# Patient Record
Sex: MALE | Race: WHITE | URBAN - METROPOLITAN AREA
[De-identification: names, ages, dates, MRNs, and addresses within clinical notes are randomized per-mention and may not be internally consistent; named-entity substitution may affect disease eponyms.]

---

## 2017-01-04 ENCOUNTER — OFFICE VISIT (OUTPATIENT)
Dept: FAMILY MEDICINE | Facility: CLINIC | Age: 62
End: 2017-01-04
Payer: COMMERCIAL

## 2017-01-04 VITALS
DIASTOLIC BLOOD PRESSURE: 79 MMHG | HEIGHT: 71 IN | TEMPERATURE: 97.4 F | SYSTOLIC BLOOD PRESSURE: 128 MMHG | BODY MASS INDEX: 30.62 KG/M2 | WEIGHT: 218.7 LBS | HEART RATE: 62 BPM

## 2017-01-04 DIAGNOSIS — Z11.59 NEED FOR HEPATITIS C SCREENING TEST: ICD-10-CM

## 2017-01-04 DIAGNOSIS — Z00.00 ROUTINE GENERAL MEDICAL EXAMINATION AT A HEALTH CARE FACILITY: Primary | ICD-10-CM

## 2017-01-04 DIAGNOSIS — E78.5 HYPERLIPIDEMIA LDL GOAL <130: ICD-10-CM

## 2017-01-04 DIAGNOSIS — Z00.01 ENCOUNTER FOR ROUTINE ADULT MEDICAL EXAM WITH ABNORMAL FINDINGS: ICD-10-CM

## 2017-01-04 DIAGNOSIS — R25.2 CRAMP OF LIMB: ICD-10-CM

## 2017-01-04 PROCEDURE — 99396 PREV VISIT EST AGE 40-64: CPT | Performed by: FAMILY MEDICINE

## 2017-01-04 ASSESSMENT — ANXIETY QUESTIONNAIRES
GAD7 TOTAL SCORE: 4
1. FEELING NERVOUS, ANXIOUS, OR ON EDGE: SEVERAL DAYS
6. BECOMING EASILY ANNOYED OR IRRITABLE: NOT AT ALL
2. NOT BEING ABLE TO STOP OR CONTROL WORRYING: NOT AT ALL
5. BEING SO RESTLESS THAT IT IS HARD TO SIT STILL: NOT AT ALL
7. FEELING AFRAID AS IF SOMETHING AWFUL MIGHT HAPPEN: SEVERAL DAYS
3. WORRYING TOO MUCH ABOUT DIFFERENT THINGS: SEVERAL DAYS

## 2017-01-04 ASSESSMENT — PATIENT HEALTH QUESTIONNAIRE - PHQ9: 5. POOR APPETITE OR OVEREATING: SEVERAL DAYS

## 2017-01-04 NOTE — NURSING NOTE
"Chief Complaint   Patient presents with     Physical       Initial /79 mmHg  Pulse 62  Temp(Src) 97.4  F (36.3  C) (Tympanic)  Ht 5' 10.75\" (1.797 m)  Wt 218 lb 11.2 oz (99.202 kg)  BMI 30.72 kg/m2 Estimated body mass index is 30.72 kg/(m^2) as calculated from the following:    Height as of this encounter: 5' 10.75\" (1.797 m).    Weight as of this encounter: 218 lb 11.2 oz (99.202 kg).  BP completed using cuff size: large in right arm  Sherley Lutz CMA    "

## 2017-01-04 NOTE — Clinical Note
My Depression Action Plan  Name: Mynor Siddiqui   Date of Birth 1955  Date: 1/4/2017    My doctor: Anthony Manzo   My clinic: [address] [address] [phone]          GREEN    ZONE   Good Control    What it looks like:     Things are going generally well. You have normal up s and down s. You may even feel depressed from time to time, but bad moods usually last less than a day.   What you need to do:  1. Continue to care for yourself (see self care plan)  2. Check your depression survival kit and update it as needed  3. Follow your physician s recommendations including any medication.  4. Do not stop taking medication unless you consult with your physician first.           YELLOW         ZONE Getting Worse    What it looks like:     Depression is starting to interfere with your life.     It may be hard to get out of bed; you may be starting to isolate yourself from others.    Symptoms of depression are starting to last most all day and this has happened for several days.     You may have suicidal thoughts but they are not constant.   What you need to do:     1. Call your care team, your response to treatment will improve if you keep your care team informed of your progress. Yellow periods are signs an adjustment may need to be made.     2. Continue your self-care, even if you have to fake it!    3. Talk to someone in your support network    4. Open up your depression survival kit           RED    ZONE Medical Alert - Get Help    What it looks like:     Depression is seriously interfering with your life.     You may experience these or other symptoms: You can t get out of bed most days, can t work or engage in other necessary activities, you have trouble taking care of basic hygiene, or basic responsibilities, thoughts of suicide or death that will not go away, self-injurious behavior.     What you need to do:  1. Call your care team and request a  same-day appointment. If they are not available (weekends or after hours) call your local crisis line, emergency room or 911.      Electronically signed by: Sherley Lutz, January 4, 2017    Depression Self Care Plan / Survival Kit    Self-Care for Depression  Here s the deal. Your body and mind are really not as separate as most people think.  What you do and think affects how you feel and how you feel influences what you do and think. This means if you do things that people who feel good do, it will help you feel better.  Sometimes this is all it takes.  There is also a place for medication and therapy depending on how severe your depression is, so be sure to consult with your medical provider and/ or Behavioral Health Consultant if your symptoms are worsening or not improving.     In order to better manage my stress, I will:    Exercise  Get some form of exercise, every day. This will help reduce pain and release endorphins, the  feel good  chemicals in your brain. This is almost as good as taking antidepressants!  This is not the same as joining a gym and then never going! (they count on that by the way ) It can be as simple as just going for a walk or doing some gardening, anything that will get you moving.      Hygiene   Maintain good hygiene (Get out of bed in the morning, Make your bed, Brush your teeth, Take a shower, and Get dressed like you were going to work, even if you are unemployed).  If your clothes don't fit try to get ones that do.    Diet  I will strive to eat foods that are good for me, drink plenty of water, and avoid excessive sugar, caffeine, alcohol, and other mood-altering substances.  Some foods that are helpful in depression are: complex carbohydrates, B vitamins, flaxseed, fish or fish oil, fresh fruits and vegetables.    Psychotherapy  I agree to participate in Individual Therapy (if recommended).    Medication  If prescribed medications, I agree to take them.  Missing doses can result in  serious side effects.  I understand that drinking alcohol, or other illicit drug use, may cause potential side effects.  I will not stop my medication abruptly without first discussing it with my provider.    Staying Connected With Others  I will stay in touch with my friends, family members, and my primary care provider/team.    Use your imagination  Be creative.  We all have a creative side; it doesn t matter if it s oil painting, sand castles, or mud pies! This will also kick up the endorphins.    Witness Beauty  (AKA stop and smell the roses) Take a look outside, even in mid-winter. Notice colors, textures. Watch the squirrels and birds.     Service to others  Be of service to others.  There is always someone else in need.  By helping others we can  get out of ourselves  and remember the really important things.  This also provides opportunities for practicing all the other parts of the program.    Humor  Laugh and be silly!  Adjust your TV habits for less news and crime-drama and more comedy.    Control your stress  Try breathing deep, massage therapy, biofeedback, and meditation. Find time to relax each day.     My support system    Clinic Contact:  Phone number:    Contact 1:  Phone number:    Contact 2:  Phone number:    Buddhist/:  Phone number:    Therapist:  Phone number:    Local crisis center:    Phone number:    Other community support:  Phone number:

## 2017-01-04 NOTE — PROGRESS NOTES
SUBJECTIVE:     CC: Mynor Siddiqui is an 61 year old male who presents for preventative health visit.     Healthy Habits:    Annual Exam:  Getting at least 3 servings of Calcium per day:: NO  Bi-annual eye exam:: Yes  Dental care twice a year:: Yes  Sleep apnea or symptoms of sleep apnea:: None  Diet:: Regular (no restrictions)  Frequency of exercise:: None  Taking medications regularly:: Yes  Medication side effects:: Other  PHQ-2 Depressed: Several days, Several days  PHQ-2 Score: 2          Today's PHQ-2 Score:   PHQ-2 ( 1999 Pfizer) 1/1/2017 12/23/2015   Q1: Little interest or pleasure in doing things - 2   Q2: Feeling down, depressed or hopeless - 1   PHQ-2 Score - 3   Little interest or pleasure in doing things Several days -   Feeling down, depressed or hopeless Several days -   PHQ-2 Score 2 -       Abuse: Current or Past(Physical, Sexual or Emotional)- no  Do you feel safe in your environment - Yes    Social History   Substance Use Topics     Smoking status: Never Smoker      Smokeless tobacco: Never Used     Alcohol Use: No     The patient does not drink >3 drinks per day nor >7 drinks per week.    Last PSA:   PSA   Date Value Ref Range Status   03/05/2012 1.30 0 - 4 ug/L Final       Recent Labs   Lab Test  12/23/15   1116  04/08/15   1019  03/26/14   0845   CHOL  210*  172  195   HDL  49  47  34*   LDL  116*  95  106   TRIG  224*  152*  276*   CHOLHDLRATIO   --   3.7  6.0*   NHDL  161*   --    --        Reviewed orders with patient. Reviewed health maintenance and updated orders accordingly - Yes    All Histories reviewed and updated in Epic.      ROS:  C: NEGATIVE for fever, chills, change in weight  I: NEGATIVE for worrisome rashes, moles or lesions  E: NEGATIVE for vision changes or irritation  ENT: NEGATIVE for ear, mouth and throat problems  R: NEGATIVE for significant cough or SOB  CV: NEGATIVE for chest pain, palpitations or peripheral edema  GI: NEGATIVE for nausea, abdominal pain, heartburn,  "or change in bowel habits   male: negative for dysuria, hematuria, decreased urinary stream, erectile dysfunction, urethral discharge  M: NEGATIVE for significant arthralgias or myalgia  N: NEGATIVE for weakness, dizziness or paresthesias  P: NEGATIVE for changes in mood or affect    Problem list, Medication list, Allergies, and Medical/Social/Surgical histories reviewed in [facility] and updated as appropriate.  OBJECTIVE:     /79 mmHg  Pulse 62  Temp(Src) 97.4  F (36.3  C) (Tympanic)  Ht 5' 10.75\" (1.797 m)  Wt 218 lb 11.2 oz (99.202 kg)  BMI 30.72 kg/m2     EXAM:  GENERAL: healthy, alert and no distress  NECK: no adenopathy, no asymmetry, masses, or scars and thyroid normal to palpation  RESP: lungs clear to auscultation - no rales, rhonchi or wheezes  CV: regular rate and rhythm, normal S1 S2, no S3 or S4, no murmur, click or rub, no peripheral edema and peripheral pulses strong  ABDOMEN: soft, nontender, no hepatosplenomegaly, no masses and bowel sounds normal  MS: no gross musculoskeletal defects noted, no edema    ASSESSMENT/PLAN:     1. Routine general medical examination at a health care facility    - Lipid Profile with reflex to direct LDL; Future  - Hepatitis C Screen Reflex to HCV RNA Quant and Genotype; Future    2. Encounter for routine adult medical exam with abnormal findings      3. Hyperlipidemia LDL goal <130    - Lipid Profile with reflex to direct LDL; Future    4. Need for hepatitis C screening test    - Hepatitis C Screen Reflex to HCV RNA Quant and Genotype; Future    COUNSELING:  Reviewed preventive health counseling, as reflected in patient instructions       Regular exercise       Healthy diet/nutrition       Hearing screening       Colon cancer screening       Prostate cancer screening       reports that he has never smoked. He has never used smokeless tobacco.    Estimated body mass index is 30.72 kg/(m^2) as calculated from the following:    Height as of this encounter: 5' 10.75\" " (1.797 m).    Weight as of this encounter: 218 lb 11.2 oz (99.202 kg).   Weight management plan: Discussed healthy diet and exercise guidelines and patient will follow up in 12 months in clinic to re-evaluate.    Counseling Resources:  ATP IV Guidelines  Pooled Cohorts Equation Calculator  FRAX Risk Assessment  ICSI Preventive Guidelines  Dietary Guidelines for Americans, 2010  USDA's MyPlate  ASA Prophylaxis  Lung CA Screening    Anthony Manzo MD  [facility] HUGOAnswers for HPI/ROS submitted by the patient on 1/1/2017

## 2017-01-04 NOTE — PROGRESS NOTES
"SUBJECTIVE:     CC: Mynor Siddiqui is an 61 year old male who presents for preventative health visit.   Answers for HPI/ROS submitted by the patient on 1/1/2017   PHQ-2 Depressed: Several days, Several days  PHQ-2 Score: 2      HPI    {Outside tests to abstract? :163318}    {additional problems to add:235410}    Today's PHQ-2 Score:   PHQ-2 ( 1999 Pfizer) 1/1/2017   Q1: Little interest or pleasure in doing things -   Q2: Feeling down, depressed or hopeless -   PHQ-2 Score -   Little interest or pleasure in doing things Several days   Feeling down, depressed or hopeless Several days   PHQ-2 Score 2       Abuse: Current or Past(Physical, Sexual or Emotional)- {YES/NO/NA:565348}  Do you feel safe in your environment - {YES/NO/NA:168407}    Social History   Substance Use Topics     Smoking status: Never Smoker      Smokeless tobacco: Never Used     Alcohol Use: No     {ETOH AUDIT:791825}    Last PSA:   PSA   Date Value Ref Range Status   03/05/2012 1.30 0 - 4 ug/L Final       Recent Labs   Lab Test  12/23/15   1116  04/08/15   1019  03/26/14   0845   CHOL  210*  172  195   HDL  49  47  34*   LDL  116*  95  106   TRIG  224*  152*  276*   CHOLHDLRATIO   --   3.7  6.0*   NHDL  161*   --    --        Reviewed orders with patient. Reviewed health maintenance and updated orders accordingly - {Yes/No:002057::\"Yes\"}    All Histories reviewed and updated in Epic.  {HISTORY OPTIONS:797964}    ROS:  {MALE ROS-adult preventive care package:919315::\"C: NEGATIVE for fever, chills, change in weight\",\"I: NEGATIVE for worrisome rashes, moles or lesions\",\"E: NEGATIVE for vision changes or irritation\",\"ENT: NEGATIVE for ear, mouth and throat problems\",\"R: NEGATIVE for significant cough or SOB\",\"CV: NEGATIVE for chest pain, palpitations or peripheral edema\",\"GI: NEGATIVE for nausea, abdominal pain, heartburn, or change in bowel habits\",\" male: negative for dysuria, hematuria, decreased urinary stream, erectile dysfunction, urethral " "discharge\",\"M: NEGATIVE for significant arthralgias or myalgia\",\"N: NEGATIVE for weakness, dizziness or paresthesias\",\"P: NEGATIVE for changes in mood or affect\"}    {CHRONICPROBDATA:779391}  OBJECTIVE:     There were no vitals taken for this visit.  EXAM:  {Exam Choices:078355}    ASSESSMENT/PLAN:     {Diag Picklist:312018}    COUNSELING:   {MALE COUNSELING MESSAGES:336841::\"Reviewed preventive health counseling, as reflected in patient instructions\"}    {Blood Pressure - Adult Preventive:214265}     reports that he has never smoked. He has never used smokeless tobacco.  {Tobacco Cessation needed for ACO -- Delete if patient is a non-smoker:642665}  Estimated body mass index is 28.88 kg/(m^2) as calculated from the following:    Height as of 12/23/15: 5' 10.75\" (1.797 m).    Weight as of 12/23/15: 205 lb 9.6 oz (93.26 kg).   {Weight Management Plan needed for ACO:835759}    Counseling Resources:  ATP IV Guidelines  Pooled Cohorts Equation Calculator  FRAX Risk Assessment  ICSI Preventive Guidelines  Dietary Guidelines for Americans, 2010  USDA's MyPlate  ASA Prophylaxis  Lung CA Screening    Anthony Manzo MD  [facility]  "

## 2017-01-05 DIAGNOSIS — Z11.59 NEED FOR HEPATITIS C SCREENING TEST: ICD-10-CM

## 2017-01-05 DIAGNOSIS — E78.5 HYPERLIPIDEMIA LDL GOAL <130: ICD-10-CM

## 2017-01-05 DIAGNOSIS — Z00.00 ROUTINE GENERAL MEDICAL EXAMINATION AT A HEALTH CARE FACILITY: ICD-10-CM

## 2017-01-05 LAB
ANION GAP SERPL CALCULATED.3IONS-SCNC: 9 MMOL/L (ref 3–14)
BUN SERPL-MCNC: 20 MG/DL (ref 7–30)
CALCIUM SERPL-MCNC: 9.1 MG/DL (ref 8.5–10.1)
CHLORIDE SERPL-SCNC: 104 MMOL/L (ref 94–109)
CHOLEST SERPL-MCNC: 258 MG/DL
CO2 SERPL-SCNC: 26 MMOL/L (ref 20–32)
CREAT SERPL-MCNC: 1.21 MG/DL (ref 0.66–1.25)
GFR SERPL CREATININE-BSD FRML MDRD: 60 ML/MIN/1.7M2
GLUCOSE SERPL-MCNC: 82 MG/DL (ref 70–99)
HDLC SERPL-MCNC: 48 MG/DL
LDLC SERPL CALC-MCNC: 159 MG/DL
NONHDLC SERPL-MCNC: 210 MG/DL
POTASSIUM SERPL-SCNC: 4.2 MMOL/L (ref 3.4–5.3)
SODIUM SERPL-SCNC: 139 MMOL/L (ref 133–144)
TRIGL SERPL-MCNC: 253 MG/DL

## 2017-01-05 PROCEDURE — 36415 COLL VENOUS BLD VENIPUNCTURE: CPT | Performed by: FAMILY MEDICINE

## 2017-01-05 PROCEDURE — 80048 BASIC METABOLIC PNL TOTAL CA: CPT | Mod: 90 | Performed by: FAMILY MEDICINE

## 2017-01-05 PROCEDURE — 80061 LIPID PANEL: CPT | Mod: 90 | Performed by: FAMILY MEDICINE

## 2017-01-05 PROCEDURE — 86803 HEPATITIS C AB TEST: CPT | Mod: 90 | Performed by: FAMILY MEDICINE

## 2017-01-05 PROCEDURE — 99000 SPECIMEN HANDLING OFFICE-LAB: CPT | Performed by: FAMILY MEDICINE

## 2017-01-05 ASSESSMENT — ANXIETY QUESTIONNAIRES: GAD7 TOTAL SCORE: 4

## 2017-01-05 ASSESSMENT — PATIENT HEALTH QUESTIONNAIRE - PHQ9: SUM OF ALL RESPONSES TO PHQ QUESTIONS 1-9: 6

## 2017-01-06 LAB — HCV AB SERPL QL IA: NORMAL

## 2017-04-17 DIAGNOSIS — F32.0 MILD MAJOR DEPRESSION (H): ICD-10-CM

## 2017-04-17 NOTE — TELEPHONE ENCOUNTER
FLUoxetine (PROZAC) 20 MG capsule     Last Written Prescription Date: 5/6/16  Last Fill Quantity: 90, # refills: 1  Last Office Visit with G primary care provider:  1/4/17        Last PHQ-9 score on record=   PHQ-9 SCORE 1/4/2017   Total Score -   Total Score 6

## 2017-04-19 NOTE — TELEPHONE ENCOUNTER
Prescription approved per [facility] Refill Protocol.  Cholo Willis RN

## 2017-07-25 ENCOUNTER — MYC MEDICAL ADVICE (OUTPATIENT)
Dept: FAMILY MEDICINE | Facility: CLINIC | Age: 62
End: 2017-07-25

## 2017-07-25 NOTE — TELEPHONE ENCOUNTER
Explained to patient that he was not originally diagnosed and treated at [place] and we do not have the records from urgent care. Patient understood. If he gets worse, he will make and appointment to be seen.  Elina Kraft RN

## 2017-07-25 NOTE — TELEPHONE ENCOUNTER
Not a mychart. Should be an office visit can't evaluate over my chart. Please have him make an appointment. Jesenia Chaves M.D.'

## 2017-09-26 ENCOUNTER — OFFICE VISIT (OUTPATIENT)
Dept: FAMILY MEDICINE | Facility: CLINIC | Age: 62
End: 2017-09-26
Payer: COMMERCIAL

## 2017-09-26 VITALS
WEIGHT: 208.8 LBS | DIASTOLIC BLOOD PRESSURE: 69 MMHG | BODY MASS INDEX: 29.23 KG/M2 | TEMPERATURE: 97.4 F | HEIGHT: 71 IN | HEART RATE: 61 BPM | SYSTOLIC BLOOD PRESSURE: 118 MMHG

## 2017-09-26 DIAGNOSIS — H02.9 LESION OF LEFT EYELID: Primary | ICD-10-CM

## 2017-09-26 PROCEDURE — 11100 ZZHC BIOPSY SKIN/SUBQ/MUC MEM, SINGLE LESION: CPT | Performed by: FAMILY MEDICINE

## 2017-09-26 PROCEDURE — 88305 TISSUE EXAM BY PATHOLOGIST: CPT | Performed by: FAMILY MEDICINE

## 2017-09-26 ASSESSMENT — PATIENT HEALTH QUESTIONNAIRE - PHQ9
5. POOR APPETITE OR OVEREATING: NOT AT ALL
SUM OF ALL RESPONSES TO PHQ QUESTIONS 1-9: 2

## 2017-09-26 ASSESSMENT — ANXIETY QUESTIONNAIRES
1. FEELING NERVOUS, ANXIOUS, OR ON EDGE: SEVERAL DAYS
GAD7 TOTAL SCORE: 2
7. FEELING AFRAID AS IF SOMETHING AWFUL MIGHT HAPPEN: NOT AT ALL
5. BEING SO RESTLESS THAT IT IS HARD TO SIT STILL: NOT AT ALL
2. NOT BEING ABLE TO STOP OR CONTROL WORRYING: NOT AT ALL
3. WORRYING TOO MUCH ABOUT DIFFERENT THINGS: NOT AT ALL
6. BECOMING EASILY ANNOYED OR IRRITABLE: SEVERAL DAYS

## 2017-09-26 NOTE — MR AVS SNAPSHOT
"              After Visit Summary   9/26/2017    Mynor Siddiqui    MRN: [id number]           Patient Information     Date Of Birth          1955        Visit Information        Provider Department      9/26/2017 2:00 PM Anthony Manzo MD [facility]        Today's Diagnoses     Lesion of left eyelid    -  1       Follow-ups after your visit        Who to contact     Normal or non-critical lab and imaging results will be communicated to you by [facility] [employer]hart, letter or phone within 4 business days after the clinic has received the results. If you do not hear from us within 7 days, please contact the clinic through [employer]hart or phone. If you have a critical or abnormal lab result, we will notify you by phone as soon as possible.  Submit refill requests through Celly or call your pharmacy and they will forward the refill request to us. Please allow 3 business days for your refill to be completed.          If you need to speak with a  for additional information , please call: [phone]             Additional Information About Your Visit        Celly Information     Celly gives you secure access to your electronic health record. If you see a primary care provider, you can also send messages to your care team and make appointments. If you have questions, please call your primary care clinic.  If you do not have a primary care provider, please call [phone] and they will assist you.        Care EveryWhere ID     This is your Care EveryWhere ID. This could be used by other organizations to access your [place] medical records  [id number]        Your Vitals Were     Pulse Temperature Height BMI (Body Mass Index)          61 97.4  F (36.3  C) (Tympanic) 5' 10.75\" (1.797 m) 29.33 kg/m2         Blood Pressure from Last 3 Encounters:   09/26/17 118/69   01/04/17 128/79   12/23/15 136/70    Weight from Last 3 Encounters:   09/26/17 208 lb 12.8 oz (94.7 kg)   01/04/17 218 lb 11.2 oz (99.2 " kg)   12/23/15 205 lb 9.6 oz (93.3 kg)              We Performed the Following     EXC BENIGN SKIN LESION FACE/EARS 0.6-1.0 CM     Surgical pathology exam        Primary Care Provider Office Phone # Fax #    Anthony Manzo [phone]       [facility] [address] [address] [facility] [address]        Equal Access to Services     YOLANDE HOPPER : Hadii aad ku hadasho Soomaali, waaxda luqadaha, qaybta kaalmada adeegyada, waxay idiin hayaan adeeg khspencer laLauraaan ah. So [facility] [phone].    ATENCIÓN: Si habla español, tiene a mayfield disposición servicios gratuitos de asistencia lingüística. Maryann al [phone].    We comply with applicable federal civil rights laws and [place] laws. We do not discriminate on the basis of race, color, national origin, age, disability, sex, sexual orientation, or gender identity.            Thank you!     Thank you for choosing [facility]  for your care. Our goal is always to provide you with excellent care. Hearing back from our patients is one way we can continue to improve our services. Please take a few minutes to complete the written survey that you may receive in the mail after your visit with us. Thank you!             Your Updated Medication List - Protect others around you: Learn how to safely use, store and throw away your medicines at www.disposemymeds.org.          This list is accurate as of: 9/26/17 11:59 PM.  Always use your most recent med list.                   Brand Name Dispense Instructions for use Diagnosis    FLUoxetine 20 MG capsule    PROzac    90 capsule    Take 1 capsule (20 mg) by mouth daily    Mild major depression (H)       ibuprofen 200 MG tablet    ADVIL/MOTRIN     Take 2 tablets by mouth every 4 hours as needed. Once daily        sildenafil 100 MG tablet    VIAGRA    12 tablet    Take 0.5-1 tablets ( mg) by mouth daily as needed for erectile dysfunction Take 30 min to 4 hours before intercourse.  Never use with  nitroglycerin, terazosin or doxazosin.    Inability to maintain erection       tadalafil 5 MG tablet    CIALIS    30 tablet    Take 1 tablet (5 mg) by mouth daily Never use with nitroglycerin, terazosin or doxazosin.  1/2 to 4 tabs 1-6 hr before sex    Routine general medical examination at a health care facility

## 2017-09-26 NOTE — PROGRESS NOTES
SUBJECTIVE:                                                    Mynor Siddiqui is a 62 year old male who presents to clinic today for the following health issues:    Concern - growth on let eyebrow  Onset: started noticing it about a month ago    Description:   Raised but not painful. It is annoying and in the way. He cannot rub his eyes without running into it. He says it feels hard.    Intensity: mild    Progression of Symptoms:  Worsening, It has grown slightly    Accompanying Signs & Symptoms:  none    Previous history of similar problem:   If it is a skin tag he has had a few. If it is something else, he does not think so.    Precipitating factors:   Worsened by: no    Alleviating factors:  Improved by: no    Therapies Tried and outcome: none      Problem list and histories reviewed & adjusted, as indicated.  Additional history:     Patient Active Problem List   Diagnosis     Hyperlipidemia     Anxiety     HYPERLIPIDEMIA LDL GOAL <130     Advanced directives, counseling/discussion     Mild major depression (H)     Health Care Home     Past Surgical History:   Procedure Laterality Date     COLONOSCOPY  05/2006     COLONOSCOPY N/A 4/17/2015    Procedure: COLONOSCOPY;  Surgeon: Ezekiel Alfonso MD;  Location: [facility]     SURGICAL HISTORY OF -   1975    [place]       Social History   Substance Use Topics     Smoking status: Never Smoker     Smokeless tobacco: Never Used     Alcohol use No     Family History   Problem Relation Age of Onset     Alzheimer Disease Mother      OSTEOPOROSIS Mother      CANCER Father      Bladder Cancer     Cancer - colorectal Father      C.A.D. Father      Heart Attack (age 70s     CEREBROVASCULAR DISEASE Paternal Grandfather      Respiratory Sister      COPD           ROS:  Constitutional, HEENT, cardiovascular, pulmonary, gi and gu systems are negative, except as otherwise noted.    OBJECTIVE:                                                    /69 (BP Location: Right arm,  "Patient Position: Sitting, Cuff Size: Adult Large)  Pulse 61  Temp 97.4  F (36.3  C) (Tympanic)  Ht 5' 10.75\" (1.797 m)  Wt 208 lb 12.8 oz (94.7 kg)  BMI 29.33 kg/m2 Body mass index is 29.33 kg/(m^2).   GENERAL: healthy, alert, well nourished, well hydrated, no distress  HENT: ear canals- normal; TMs- normal; Nose- normal; Mouth- no ulcers, no lesions  NECK: no tenderness, no adenopathy, no asymmetry, no masses, no stiffness; thyroid- normal to palpation  RESP: lungs clear to auscultation - no rales, no rhonchi, no wheezes  CV: regular rates and rhythm, normal S1 S2, no S3 or S4 and no murmur, no click or rub -  ABDOMEN: soft, no tenderness, no  hepatosplenomegaly, no masses, normal bowel sounds  Skin: has variegated lesion with keratosis.      ASSESSMENT/PLAN:                                                      (H02.9) Lesion of left eyelid  (primary encounter diagnosis)  Removed without complcations  Plan: Surgical pathology exam       reports that he has never smoked. He has never used smokeless tobacco.        [facility]  "

## 2017-09-26 NOTE — NURSING NOTE
"Chief Complaint   Patient presents with     Growth     on eyebrow       Initial /69 (BP Location: Right arm, Patient Position: Sitting, Cuff Size: Adult Large)  Pulse 61  Temp 97.4  F (36.3  C) (Tympanic)  Ht 5' 10.75\" (1.797 m)  Wt 208 lb 12.8 oz (94.7 kg)  BMI 29.33 kg/m2 Estimated body mass index is 29.33 kg/(m^2) as calculated from the following:    Height as of this encounter: 5' 10.75\" (1.797 m).    Weight as of this encounter: 208 lb 12.8 oz (94.7 kg).  Medication Reconciliation: complete   Sherley Lutz CMA  "

## 2017-09-27 ASSESSMENT — ANXIETY QUESTIONNAIRES: GAD7 TOTAL SCORE: 2

## 2017-09-28 LAB — COPATH REPORT: NORMAL

## 2017-10-10 NOTE — PROCEDURES
After discussing Risks, Benefits and alternative treatments, answering any questons to .name satisfaction. patient requested procedure peformed and signed consent.     Area was sterily pred, drapped.  Using sterile technique lesion was removed, using shave teniquewithot any complication.  Anesthesia was occomplished with 1% lidocaine.  Pt tolerated procedure well.    O.4cc blood loss  Sterile dressing applied.  Hemastasis was obtained with direct pressure

## 2017-11-30 ENCOUNTER — MYC MEDICAL ADVICE (OUTPATIENT)
Dept: FAMILY MEDICINE | Facility: CLINIC | Age: 62
End: 2017-11-30

## 2017-11-30 DIAGNOSIS — M25.569 ACUTE KNEE PAIN, UNSPECIFIED LATERALITY: Primary | ICD-10-CM

## 2018-02-26 DIAGNOSIS — F32.0 MILD MAJOR DEPRESSION (H): ICD-10-CM

## 2018-02-28 NOTE — TELEPHONE ENCOUNTER
"Requested Prescriptions   Pending Prescriptions Disp Refills     FLUoxetine (PROZAC) 20 MG capsule 90 capsule 2     Sig: Take 1 capsule (20 mg) by mouth daily    SSRIs Protocol Passed    2/26/2018 11:05 AM       Passed - PHQ-9 score less than 5 in past 6 months    The PHQ-9 criteria is meant to fail. It requires a PHQ-9 score review         Passed - Patient is age 18 or older       Passed - Recent (6 mo) or future visit with authorizing provider's specialty    Patient had office visit in the last 6 months or has a visit in the next 30 days with authorizing provider.  See \"Patient Info\" tab in inbasket, or \"Choose Columns\" in Meds & Orders section of the refill encounter.            Last Written Prescription Date:  4/19/17  Last Fill Quantity: 90,  # refills: 2   Last office visit: 9/26/2017 with prescribing provider:  MARC   Future Office Visit:      "

## 2018-04-16 ENCOUNTER — MYC MEDICAL ADVICE (OUTPATIENT)
Dept: FAMILY MEDICINE | Facility: CLINIC | Age: 63
End: 2018-04-16

## 2018-04-16 DIAGNOSIS — M25.569 ACUTE KNEE PAIN, UNSPECIFIED LATERALITY: ICD-10-CM

## 2018-05-23 ENCOUNTER — TELEPHONE (OUTPATIENT)
Dept: FAMILY MEDICINE | Facility: CLINIC | Age: 63
End: 2018-05-23

## 2018-05-23 ENCOUNTER — MYC MEDICAL ADVICE (OUTPATIENT)
Dept: FAMILY MEDICINE | Facility: CLINIC | Age: 63
End: 2018-05-23

## 2018-05-23 ENCOUNTER — OFFICE VISIT (OUTPATIENT)
Dept: FAMILY MEDICINE | Facility: CLINIC | Age: 63
End: 2018-05-23
Payer: MEDICARE

## 2018-05-23 VITALS
SYSTOLIC BLOOD PRESSURE: 114 MMHG | WEIGHT: 194.4 LBS | HEART RATE: 53 BPM | TEMPERATURE: 97.3 F | BODY MASS INDEX: 27.22 KG/M2 | HEIGHT: 71 IN | DIASTOLIC BLOOD PRESSURE: 70 MMHG

## 2018-05-23 DIAGNOSIS — N52.03 COMBINED ARTERIAL INSUFFICIENCY AND CORPORO-VENOUS OCCLUSIVE ERECTILE DYSFUNCTION: ICD-10-CM

## 2018-05-23 DIAGNOSIS — Z00.00 ENCOUNTER FOR WELLNESS EXAMINATION: Primary | ICD-10-CM

## 2018-05-23 DIAGNOSIS — F43.23 ADJUSTMENT DISORDER WITH MIXED ANXIETY AND DEPRESSED MOOD: ICD-10-CM

## 2018-05-23 DIAGNOSIS — F32.0 MILD MAJOR DEPRESSION (H): ICD-10-CM

## 2018-05-23 DIAGNOSIS — K42.9 PERIUMBILICAL HERNIA: ICD-10-CM

## 2018-05-23 DIAGNOSIS — Z13.220 LIPID SCREENING: ICD-10-CM

## 2018-05-23 DIAGNOSIS — Z11.4 SCREENING FOR HIV (HUMAN IMMUNODEFICIENCY VIRUS): ICD-10-CM

## 2018-05-23 LAB
CHOLEST SERPL-MCNC: 207 MG/DL
HDLC SERPL-MCNC: 60 MG/DL
LDLC SERPL CALC-MCNC: 125 MG/DL
NONHDLC SERPL-MCNC: 147 MG/DL
TRIGL SERPL-MCNC: 109 MG/DL

## 2018-05-23 PROCEDURE — 87389 HIV-1 AG W/HIV-1&-2 AB AG IA: CPT | Performed by: FAMILY MEDICINE

## 2018-05-23 PROCEDURE — 80061 LIPID PANEL: CPT | Performed by: FAMILY MEDICINE

## 2018-05-23 PROCEDURE — G0402 INITIAL PREVENTIVE EXAM: HCPCS | Performed by: FAMILY MEDICINE

## 2018-05-23 PROCEDURE — 36415 COLL VENOUS BLD VENIPUNCTURE: CPT | Performed by: FAMILY MEDICINE

## 2018-05-23 RX ORDER — BUPROPION HYDROCHLORIDE 150 MG/1
150 TABLET ORAL EVERY MORNING
Qty: 90 TABLET | Refills: 1 | Status: SHIPPED | OUTPATIENT
Start: 2018-05-23 | End: 2018-11-27

## 2018-05-23 RX ORDER — SILDENAFIL CITRATE 20 MG/1
TABLET ORAL
Qty: 90 TABLET | Refills: 11 | Status: SHIPPED | OUTPATIENT
Start: 2018-05-23 | End: 2018-09-19

## 2018-05-23 RX ORDER — SILDENAFIL CITRATE 20 MG/1
TABLET ORAL
Qty: 90 TABLET | Refills: 11 | Status: SHIPPED | OUTPATIENT
Start: 2018-05-23

## 2018-05-23 ASSESSMENT — ANXIETY QUESTIONNAIRES
2. NOT BEING ABLE TO STOP OR CONTROL WORRYING: SEVERAL DAYS
GAD7 TOTAL SCORE: 12
1. FEELING NERVOUS, ANXIOUS, OR ON EDGE: NEARLY EVERY DAY
3. WORRYING TOO MUCH ABOUT DIFFERENT THINGS: MORE THAN HALF THE DAYS
6. BECOMING EASILY ANNOYED OR IRRITABLE: SEVERAL DAYS
7. FEELING AFRAID AS IF SOMETHING AWFUL MIGHT HAPPEN: SEVERAL DAYS
5. BEING SO RESTLESS THAT IT IS HARD TO SIT STILL: NEARLY EVERY DAY

## 2018-05-23 ASSESSMENT — PAIN SCALES - GENERAL: PAINLEVEL: NO PAIN (0)

## 2018-05-23 ASSESSMENT — PATIENT HEALTH QUESTIONNAIRE - PHQ9: 5. POOR APPETITE OR OVEREATING: SEVERAL DAYS

## 2018-05-23 NOTE — LETTER
My Depression Action Plan  Name: Mynor Siddiqui   Date of Birth 1955  Date: 5/23/2018    My doctor: Anthony Manzo   My clinic: [address] [address] [phone]          GREEN    ZONE   Good Control    What it looks like:     Things are going generally well. You have normal up s and down s. You may even feel depressed from time to time, but bad moods usually last less than a day.   What you need to do:  1. Continue to care for yourself (see self care plan)  2. Check your depression survival kit and update it as needed  3. Follow your physician s recommendations including any medication.  4. Do not stop taking medication unless you consult with your physician first.           YELLOW         ZONE Getting Worse    What it looks like:     Depression is starting to interfere with your life.     It may be hard to get out of bed; you may be starting to isolate yourself from others.    Symptoms of depression are starting to last most all day and this has happened for several days.     You may have suicidal thoughts but they are not constant.   What you need to do:     1. Call your care team, your response to treatment will improve if you keep your care team informed of your progress. Yellow periods are signs an adjustment may need to be made.     2. Continue your self-care, even if you have to fake it!    3. Talk to someone in your support network    4. Open up your depression survival kit           RED    ZONE Medical Alert - Get Help    What it looks like:     Depression is seriously interfering with your life.     You may experience these or other symptoms: You can t get out of bed most days, can t work or engage in other necessary activities, you have trouble taking care of basic hygiene, or basic responsibilities, thoughts of suicide or death that will not go away, self-injurious behavior.     What you need to do:  1. Call your care team and request a  same-day appointment. If they are not available (weekends or after hours) call your local crisis line, emergency room or 911.            Depression Self Care Plan / Survival Kit    Self-Care for Depression  Here s the deal. Your body and mind are really not as separate as most people think.  What you do and think affects how you feel and how you feel influences what you do and think. This means if you do things that people who feel good do, it will help you feel better.  Sometimes this is all it takes.  There is also a place for medication and therapy depending on how severe your depression is, so be sure to consult with your medical provider and/ or Behavioral Health Consultant if your symptoms are worsening or not improving.     In order to better manage my stress, I will:    Exercise  Get some form of exercise, every day. This will help reduce pain and release endorphins, the  feel good  chemicals in your brain. This is almost as good as taking antidepressants!  This is not the same as joining a gym and then never going! (they count on that by the way ) It can be as simple as just going for a walk or doing some gardening, anything that will get you moving.      Hygiene   Maintain good hygiene (Get out of bed in the morning, Make your bed, Brush your teeth, Take a shower, and Get dressed like you were going to work, even if you are unemployed).  If your clothes don't fit try to get ones that do.    Diet  I will strive to eat foods that are good for me, drink plenty of water, and avoid excessive sugar, caffeine, alcohol, and other mood-altering substances.  Some foods that are helpful in depression are: complex carbohydrates, B vitamins, flaxseed, fish or fish oil, fresh fruits and vegetables.    Psychotherapy  I agree to participate in Individual Therapy (if recommended).    Medication  If prescribed medications, I agree to take them.  Missing doses can result in serious side effects.  I understand that drinking  alcohol, or other illicit drug use, may cause potential side effects.  I will not stop my medication abruptly without first discussing it with my provider.    Staying Connected With Others  I will stay in touch with my friends, family members, and my primary care provider/team.    Use your imagination  Be creative.  We all have a creative side; it doesn t matter if it s oil painting, sand castles, or mud pies! This will also kick up the endorphins.    Witness Beauty  (AKA stop and smell the roses) Take a look outside, even in mid-winter. Notice colors, textures. Watch the squirrels and birds.     Service to others  Be of service to others.  There is always someone else in need.  By helping others we can  get out of ourselves  and remember the really important things.  This also provides opportunities for practicing all the other parts of the program.    Humor  Laugh and be silly!  Adjust your TV habits for less news and crime-drama and more comedy.    Control your stress  Try breathing deep, massage therapy, biofeedback, and meditation. Find time to relax each day.     My support system    Clinic Contact:  Phone number:    Contact 1:  Phone number:    Contact 2:  Phone number:    Confucianism/:  Phone number:    Therapist:  Phone number:    Local crisis center:    Phone number:    Other community support:  Phone number:

## 2018-05-23 NOTE — TELEPHONE ENCOUNTER
Prior Authorization Retail Medication Request    Medication/Dose: Sildnafil  ICD code (if different than what is on RX):  Combined arterial insufficiency and corporo-venous occlusive erectile dysfunction [N52.03]  Previously Tried and Failed:  Tadalafil  Rationale:  ED    Insurance Name: [employer]   Insurance ID:  [id number]      Pharmacy Information (if different than what is on RX)  Name:  [facility]  Phone:  [phone]

## 2018-05-23 NOTE — PROGRESS NOTES
SUBJECTIVE:   Mynor Siddiqui is a 63 year old male who presents for Preventive Visit.    Are you in the first 12 months of your Medicare Part B coverage?  Yes with glasses,  Visual Acuity:  Right Eye: 20/32   Left Eye: 20/25  Both Eyes: 20/25    Healthy Habits:    Do you get at least three servings of calcium containing foods daily (dairy, green leafy vegetables, etc.)? no, taking calcium and/or vitamin D supplement: no    Amount of exercise or daily activities, outside of work: 3 day(s) per week    Problems taking medications regularly No    Medication side effects: No    Have you had an eye exam in the past two years? yes    Do you see a dentist twice per year? yes    Do you have sleep apnea, excessive snoring or daytime drowsiness?no      Ability to successfully perform activities of daily living: Yes, no assistance needed    Home safety:  none identified     Hearing impairment: Yes, slight    Fall risk:  Fallen 2 or more times in the past year?: No  Any fall with injury in the past year?: No    click delete button to remove this line now    COGNITIVE SCREEN  1) Repeat 3 items (Banana, Sunrise, Chair)    2) Clock draw: NORMAL  3) 3 item recall: Recalls 2 objects   Results: NORMAL clock, 1-2 items recalled: COGNITIVE IMPAIRMENT LESS LIKELY    Mini-CogTM Copyright S Evette. Licensed by the author for use in [facility]; reprinted with permission (farhat@[facility]). All rights reserved.    Wt Readings from Last 10 Encounters:   05/23/18 194 lb 6.4 oz (88.2 kg)   09/26/17 208 lb 12.8 oz (94.7 kg)   01/04/17 218 lb 11.2 oz (99.2 kg)   12/23/15 205 lb 9.6 oz (93.3 kg)   04/08/15 227 lb (103 kg)   12/09/14 222 lb 11.2 oz (101 kg)   09/03/14 214 lb 14.4 oz (97.5 kg)   07/02/14 219 lb 3.2 oz (99.4 kg)   03/31/14 216 lb 12.8 oz (98.3 kg)   03/20/13 235 lb 11.2 oz (106.9 kg)     Patient informed that anything we discuss that is not related to preventative medicine, may be billed for; patient verbalizes  understanding.    **He wants to talk about his right knee.  **He would like his belly button looked at.  **He also has some medication questions.       Reviewed and updated as needed this visit by clinical staff  Tobacco  Allergies  Meds  Med Hx  Surg Hx  Fam Hx  Soc Hx        Reviewed and updated as needed this visit by Provider        Social History   Substance Use Topics     Smoking status: Never Smoker     Smokeless tobacco: Never Used     Alcohol use No       If you drink alcohol do you typically have >3 drinks per day or >7 drinks per week? No                        Today's PHQ-2 Score:   PHQ-2 ( 1999 Pfizer) 5/23/2018 1/4/2017   Q1: Little interest or pleasure in doing things 1 1   Q2: Feeling down, depressed or hopeless 1 1   PHQ-2 Score 2 2   Q1: Little interest or pleasure in doing things - -   Q2: Feeling down, depressed or hopeless - -   PHQ-2 Score - -       Do you feel safe in your environment - YES    Do you have a Health Care Directive?: No: Advance care planning reviewed with patient; information given to patient to review.    Current providers sharing in care for this patient include:   Patient Care Team:  Anthony Manzo MD as PCP - General    The following health maintenance items are reviewed in Epic and correct as of today:  Health Maintenance   Topic Date Due     HIV SCREEN (SYSTEM ASSIGNED)  02/10/1971     ADVANCE DIRECTIVE PLANNING Q5 YRS  03/07/2017     DEPRESSION ACTION PLAN Q1 YR  01/04/2018     LIPID MONITORING Q1 YEAR  01/05/2018     FALL RISK ASSESSMENT  02/10/2018     PNEUMOCOCCAL (1 of 2 - PCV13) 02/10/2018     AORTIC ANEURYSM SCREENING (SYSTEM ASSIGNED)  02/10/2018     PHQ-9 Q6 MONTHS  03/26/2018     INFLUENZA VACCINE (Season Ended) 09/01/2018     COLONOSCOPY Q5 YR  04/17/2020     TETANUS Q10 YR  03/07/2022     HEPATITIS C SCREENING  Completed         ROS:  Constitutional, HEENT, cardiovascular, pulmonary, gi and gu systems are negative, except as otherwise  "noted.    OBJECTIVE:   Ht 5' 10.75\" (1.797 m)  Wt 194 lb 6.4 oz (88.2 kg)  BMI 27.31 kg/m2 Estimated body mass index is 27.31 kg/(m^2) as calculated from the following:    Height as of this encounter: 5' 10.75\" (1.797 m).    Weight as of this encounter: 194 lb 6.4 oz (88.2 kg).  EXAM:   GENERAL: healthy, alert and no distress  NECK: no adenopathy, no asymmetry, masses, or scars and thyroid normal to palpation  RESP: lungs clear to auscultation - no rales, rhonchi or wheezes  CV: regular rate and rhythm, normal S1 S2, no S3 or S4, no murmur, click or rub, no peripheral edema and peripheral pulses strong  ABDOMEN: brad umbilical hernia reducible.  soft, nontender, no hepatosplenomegaly, no masses and bowel sounds normal  MS: no gross musculoskeletal defects noted, no edema    ASSESSMENT / PLAN:     (Z00.00) Encounter for wellness examination  (primary encounter diagnosis)    (Z11.4) Screening for HIV (human immunodeficiency virus)  Plan: HIV Screening    (Z13.220) Lipid screening  Plan: Lipid panel reflex to direct LDL Fasting      (N52.03) Combined arterial insufficiency and corporo-venous occlusive erectile dysfunction  Comment Good control.  Continue currant medications, continue to monito   Plan: sildenafil (REVATIO) 20 MG tablet,    (F32.0) Mild major depression (H) sexual side effect makes ed more problomatic   buPROPion (WELLBUTRIN XL) 150 MG 24 hr tablet    Call me in 2 weeks with update.    2. Lipid screening    - Lipid panel reflex to direct LDL Fasting    3. Combined arterial insufficiency and corporo-venous occlusive erectile dysfunction  sildenafil (REVATIO) 20 MG tablet; 3-5 tabs 1 hour before sex.  Dispense: 90 tablet; Refill: 11    Hernia- periumbilicle.  He will look into surgery this winter.  Discussed  Using common sense when lifting.  No lifting in remote areas by himself  ie bwca.  End of Life Planning:  Patient currently has an advanced directive: Yes.  Practitioner is supportive of " "decision.    COUNSELING:  Reviewed preventive health counseling, as reflected in patient instructions       Regular exercise       Healthy diet/nutrition       Vision screening       Hearing screening       Colon cancer screening       Prostate cancer screening      Estimated body mass index is 27.31 kg/(m^2) as calculated from the following:    Height as of this encounter: 5' 10.75\" (1.797 m).    Weight as of this encounter: 194 lb 6.4 oz (88.2 kg).       reports that he has never smoked. He has never used smokeless tobacco.      Appropriate preventive services were discussed with this patient, including applicable screening as appropriate for cardiovascular disease, diabetes, osteopenia/osteoporosis, and glaucoma.  As appropriate for age/gender, discussed screening for colorectal cancer, prostate cancer, breast cancer, and cervical cancer. Checklist reviewing preventive services available has been given to the patient.    Reviewed patients plan of care and provided an AVS. The Intermediate Care Plan ( asthma action plan, low back pain action plan, and migraine action plan) for Mynor meets the Care Plan requirement. This Care Plan has been established and reviewed with the Patient.    Counseling Resources:  ATP IV Guidelines  Pooled Cohorts Equation Calculator  Breast Cancer Risk Calculator  FRAX Risk Assessment  ICSI Preventive Guidelines  Dietary Guidelines for Americans, 2010  [employer]'s MyPlate  ASA Prophylaxis  Lung CA Screening    Anthony Manzo MD  [facility]  "

## 2018-05-23 NOTE — MR AVS SNAPSHOT
After Visit Summary   5/23/2018    Mynor Siddiqui    MRN: [id number]           Patient Information     Date Of Birth          1955        Visit Information        Provider Department      5/23/2018 8:00 AM Anthony Manzo MD [facility]        Today's Diagnoses     Encounter for wellness examination    -  1    Screening for HIV (human immunodeficiency virus)        Lipid screening        Combined arterial insufficiency and corporo-venous occlusive erectile dysfunction        Mild major depression (H)        Adjustment disorder with mixed anxiety and depressed mood        Periumbilical hernia          Care Instructions      Preventive Health Recommendations:       Male Ages 65 and over    Yearly exam:             See your health care provider every year in order to  o   Review health changes.   o   Discuss preventive care.    o   Review your medicines if your doctor has prescribed any.    Talk with your health care provider about whether you should have a test to screen for prostate cancer (PSA).    Every 3 years, have a diabetes test (fasting glucose). If you are at risk for diabetes, you should have this test more often.    Every 5 years, have a cholesterol test. Have this test more often if you are at risk for high cholesterol or heart disease.     Every 10 years, have a colonoscopy. Or, have a yearly FIT test (stool test). These exams will check for colon cancer.    Talk to with your health care provider about screening for Abdominal Aortic Aneurysm if you have a family history of AAA or have a history of smoking.  Shots:     Get a flu shot each year.     Get a tetanus shot every 10 years.     Talk to your doctor about your pneumonia vaccines. There are now two you should receive - Pneumovax (PPSV 23) and Prevnar (PCV 13).    Talk to your doctor about a shingles vaccine.     Talk to your doctor about the hepatitis B vaccine.  Nutrition:     Eat at least 5 servings of fruits and  vegetables each day.     Eat whole-grain bread, whole-wheat pasta and brown rice instead of white grains and rice.     Talk to your doctor about Calcium and Vitamin D.   Lifestyle    Exercise for at least 150 minutes a week (30 minutes a day, 5 days a week). This will help you control your weight and prevent disease.     Limit alcohol to one drink per day.     No smoking.     Wear sunscreen to prevent skin cancer.     See your dentist every six months for an exam and cleaning.     See your eye doctor every 1 to 2 years to screen for conditions such as glaucoma, macular degeneration and cataracts.          Follow-ups after your visit        Who to contact     Normal or non-critical lab and imaging results will be communicated to you by Plixhart, letter or phone within 4 business days after the clinic has received the results. If you do not hear from us within 7 days, please contact the clinic through MDLIVEt or phone. If you have a critical or abnormal lab result, we will notify you by phone as soon as possible.  Submit refill requests through [employer] or call your pharmacy and they will forward the refill request to us. Please allow 3 business days for your refill to be completed.          If you need to speak with a  for additional information , please call: [phone]             Additional Information About Your Visit        [employer] Information     [employer] gives you secure access to your electronic health record. If you see a primary care provider, you can also send messages to your care team and make appointments. If you have questions, please call your primary care clinic.  If you do not have a primary care provider, please call [phone] and they will assist you.        Care EveryWhere ID     This is your Care EveryWhere ID. This could be used by other organizations to access your [facility] medical records  [id number]        Your Vitals Were     Pulse Temperature Height BMI (Body Mass  "Index)          53 97.3  F (36.3  C) (Tympanic) 5' 10.75\" (1.797 m) 27.31 kg/m2         Blood Pressure from Last 3 Encounters:   05/23/18 114/70   09/26/17 118/69   01/04/17 128/79    Weight from Last 3 Encounters:   05/23/18 194 lb 6.4 oz (88.2 kg)   09/26/17 208 lb 12.8 oz (94.7 kg)   01/04/17 218 lb 11.2 oz (99.2 kg)              We Performed the Following     DEPRESSION ACTION PLAN (DAP)     HIV Screening     Lipid panel reflex to direct LDL Fasting          Today's Medication Changes          These changes are accurate as of 5/23/18 11:59 PM.  If you have any questions, ask your nurse or doctor.               Start taking these medicines.        Dose/Directions    buPROPion 150 MG 24 hr tablet   Commonly known as:  WELLBUTRIN XL   Used for:  Adjustment disorder with mixed anxiety and depressed mood   Started by:  Anthony Manzo MD        Dose:  150 mg   Take 1 tablet (150 mg) by mouth every morning   Quantity:  90 tablet   Refills:  1       * sildenafil 20 MG tablet   Commonly known as:  REVATIO   Used for:  Combined arterial insufficiency and corporo-venous occlusive erectile dysfunction   Started by:  Anthony Manzo MD        3-5 tabs 1 hour before sex.   Quantity:  90 tablet   Refills:  11       * sildenafil 20 MG tablet   Commonly known as:  REVATIO   Used for:  Combined arterial insufficiency and corporo-venous occlusive erectile dysfunction   Started by:  Anthony Manzo MD        3-5 tabs 1 hour before sex   Quantity:  90 tablet   Refills:  11       * Notice:  This list has 2 medication(s) that are the same as other medications prescribed for you. Read the directions carefully, and ask your doctor or other care provider to review them with you.      Stop taking these medicines if you haven't already. Please contact your care team if you have questions.     tadalafil 5 MG tablet   Commonly known as:  CIALIS   Stopped by:  Anthony Manzo MD                Where to get your medicines      These " medications were sent to [id number] - [place], [place] - [address] AT [facility] [address]  [address], [place] [address]     Phone:  [phone]     buPROPion 150 MG 24 hr tablet    sildenafil 20 MG tablet    sildenafil 20 MG tablet                Primary Care Provider Office Phone # Fax #    Anthony Manzo [phone] [id number] [facility] [address]        Equal Access to Services     DANIA HOPPER : Hadii aad ku hadasho Soomaali, waaxda luqadaha, qaybta kaalmada adeegyada, waxay idiin hayaan adeeg kharash lavj . [facility] [phone].    ATENCIÓN: Si habla español, tiene a mayfield disposición servicios gratuitos de asistencia lingüística. [facility] [phone].    We comply with applicable federal civil rights laws and [place] laws. We do not discriminate on the basis of race, color, national origin, age, disability, sex, sexual orientation, or gender identity.            Thank you!     Thank you for choosing [facility]  for your care. Our goal is always to provide you with excellent care. Hearing back from our patients is one way we can continue to improve our services. Please take a few minutes to complete the written survey that you may receive in the mail after your visit with us. Thank you!             Your Updated Medication List - Protect others around you: Learn how to safely use, store and throw away your medicines at www.disposemymeds.org.          This list is accurate as of 5/23/18 11:59 PM.  Always use your most recent med list.                   Brand Name Dispense Instructions for use Diagnosis    buPROPion 150 MG 24 hr tablet    WELLBUTRIN XL    90 tablet    Take 1 tablet (150 mg) by mouth every morning    Adjustment disorder with mixed anxiety and depressed mood       diclofenac 50 MG EC tablet    VOLTAREN    60 tablet    Take 1 tablet (50 mg) by mouth 3 times daily as needed for moderate pain    Acute knee pain, unspecified  laterality       FLUoxetine 20 MG capsule    PROzac    90 capsule    Take 1 capsule (20 mg) by mouth daily    Mild major depression (H)       sildenafil 100 MG tablet    VIAGRA    12 tablet    Take 0.5-1 tablets ( mg) by mouth daily as needed for erectile dysfunction Take 30 min to 4 hours before intercourse.  Never use with nitroglycerin, terazosin or doxazosin.    Inability to maintain erection       * sildenafil 20 MG tablet    REVATIO    90 tablet    3-5 tabs 1 hour before sex.    Combined arterial insufficiency and corporo-venous occlusive erectile dysfunction       * sildenafil 20 MG tablet    REVATIO    90 tablet    3-5 tabs 1 hour before sex    Combined arterial insufficiency and corporo-venous occlusive erectile dysfunction       * Notice:  This list has 2 medication(s) that are the same as other medications prescribed for you. Read the directions carefully, and ask your doctor or other care provider to review them with you.

## 2018-05-24 LAB — HIV 1+2 AB+HIV1 P24 AG SERPL QL IA: NONREACTIVE

## 2018-05-24 ASSESSMENT — PATIENT HEALTH QUESTIONNAIRE - PHQ9: SUM OF ALL RESPONSES TO PHQ QUESTIONS 1-9: 10

## 2018-05-24 ASSESSMENT — ANXIETY QUESTIONNAIRES: GAD7 TOTAL SCORE: 12

## 2018-05-24 NOTE — TELEPHONE ENCOUNTER
PA Initiation    Medication: SILDENAFIL 20MG  Insurance Company: [employer] ([facility]) - Phone [phone] Fax [phone]  Pharmacy Filling the Rx: [id number] [place] [facility] - [place], [place] - [address] [place] [address] E  Filling Pharmacy Phone: [phone]  Filling Pharmacy Fax:    Start Date: 5/24/2018

## 2018-05-25 NOTE — TELEPHONE ENCOUNTER
PRIOR AUTHORIZATION DENIED    Medication: SILDENAFIL 20MG-DENIED    Denial Date: 5/24/2018    Denial Rational: MEDICATION IS EXCLUDED FROM COVERAGE.        Appeal Information:  IF YOU WOULD LIKE TO APPEAL PLEASE SUPPLY PA TEAM WITH A LETTER OF MEDICAL NECESSITY WITH CLINICAL REASON.

## 2018-06-07 DIAGNOSIS — M25.569 ACUTE KNEE PAIN, UNSPECIFIED LATERALITY: ICD-10-CM

## 2018-06-07 NOTE — TELEPHONE ENCOUNTER
"DICLOFENAC SODIUM 50MG DR TABLETS        Last Written Prescription Date:  4/16/18  Last Fill Quantity: 60,   # refills: 1  Last Office Visit: 5/23/18  Future Office visit:       Requested Prescriptions   Pending Prescriptions Disp Refills     diclofenac (VOLTAREN) 50 MG EC tablet [Pharmacy Med Name: DICLOFENAC SODIUM 50MG DR TABLETS] 270 tablet 0     Sig: TAKE 1 TABLET(50 MG) BY MOUTH THREE TIMES DAILY AS NEEDED FOR MODERATE PAIN    NSAID Medications Failed    6/7/2018  7:44 AM       Failed - Normal ALT on file in past 12 months    Recent Labs   Lab Test  02/01/13   0842   ALT  37            Failed - Normal AST on file in past 12 months    No lab results found.         Failed - Patient is age 6-64 years       Failed - Normal CBC on file in past 12 months    Recent Labs   Lab Test  02/16/11   1240   WBC  6.9   RBC  5.24   HGB  15.4   HCT  45.5   PLT  269       For [facility] ONLY: [id number] = WBC, KAPK715 = RBC         Failed - Normal serum creatinine on file in past 12 months    Recent Labs   Lab Test  01/05/17   0745   CR  1.21            Passed - Blood pressure under 140/90 in past 12 months    BP Readings from Last 3 Encounters:   05/23/18 114/70   09/26/17 118/69   01/04/17 128/79                Passed - Recent (12 mo) or future (30 days) visit within the authorizing provider's specialty    Patient had office visit in the last 12 months or has a visit in the next 30 days with authorizing provider or within the authorizing provider's specialty.  See \"Patient Info\" tab in inbasket, or \"Choose Columns\" in Meds & Orders section of the refill encounter.              "

## 2018-06-22 ENCOUNTER — MYC MEDICAL ADVICE (OUTPATIENT)
Dept: FAMILY MEDICINE | Facility: CLINIC | Age: 63
End: 2018-06-22

## 2018-06-22 NOTE — TELEPHONE ENCOUNTER
"Dosing of \"viagra\" is  mg. \"Sildenafil\" generic is 20 mg tablets so you can take 3-5 tablets =  mg.  Insurance coverage is bad, this is usually the most cost effective but he can ask pharmacy.  Tona Mendez PA-C   "

## 2018-06-29 ENCOUNTER — MYC MEDICAL ADVICE (OUTPATIENT)
Dept: FAMILY MEDICINE | Facility: CLINIC | Age: 63
End: 2018-06-29

## 2018-07-04 ENCOUNTER — MYC MEDICAL ADVICE (OUTPATIENT)
Dept: FAMILY MEDICINE | Facility: CLINIC | Age: 63
End: 2018-07-04

## 2018-07-04 DIAGNOSIS — M79.10 MYALGIA: Primary | ICD-10-CM

## 2018-07-06 DIAGNOSIS — M79.10 MYALGIA: ICD-10-CM

## 2018-07-06 LAB — B BURGDOR IGG+IGM SER QL: 0.12 (ref 0–0.89)

## 2018-07-06 PROCEDURE — 86618 LYME DISEASE ANTIBODY: CPT | Performed by: FAMILY MEDICINE

## 2018-09-13 DIAGNOSIS — M25.569 ACUTE KNEE PAIN, UNSPECIFIED LATERALITY: ICD-10-CM

## 2018-09-13 NOTE — TELEPHONE ENCOUNTER
Routing refill request to provider for review/approval because:  Drug not on the [facility] refill protocol   Cholo Willis RN

## 2018-09-19 ENCOUNTER — OFFICE VISIT (OUTPATIENT)
Dept: FAMILY MEDICINE | Facility: CLINIC | Age: 63
End: 2018-09-19
Payer: MEDICARE

## 2018-09-19 VITALS
RESPIRATION RATE: 14 BRPM | TEMPERATURE: 97.9 F | HEART RATE: 78 BPM | SYSTOLIC BLOOD PRESSURE: 125 MMHG | OXYGEN SATURATION: 98 % | HEIGHT: 70 IN | WEIGHT: 179 LBS | BODY MASS INDEX: 25.62 KG/M2 | DIASTOLIC BLOOD PRESSURE: 83 MMHG

## 2018-09-19 DIAGNOSIS — L50.9 URTICARIA: Primary | ICD-10-CM

## 2018-09-19 PROCEDURE — 99213 OFFICE O/P EST LOW 20 MIN: CPT | Performed by: NURSE PRACTITIONER

## 2018-09-19 NOTE — PROGRESS NOTES
"  SUBJECTIVE:   Mynor Siddiqui is a 63 year old male who presents to clinic today for the following health issues:    Hives      Duration: past 24 hours    Description  Location: arms, abdomen and back  Itching: itching for past two weeks, hives noticed today    Intensity:  moderate    Accompanying signs and symptoms: minor flush    History (similar episodes/previous evaluation): Pt had shingles 3 years ago    Precipitating or alleviating factors:  New exposures:  None  Recent travel: no      Therapies tried and outcome: none    No recent travel  No recent swimming.    No new foods or medicines.    Pt is packing their home to move to [place] next week.  Has opened lots of boxes and repacking lots of old stuff.      Reviewed and updated as needed this visit by clinical staff  Tobacco  Allergies  Meds  Problems  Med Hx  Surg Hx  Fam Hx  Soc Hx        Reviewed and updated as needed this visit by Provider  Tobacco  Allergies  Meds  Problems  Med Hx  Surg Hx  Fam Hx  Soc Hx          ROS:  Constitutional, HEENT, cardiovascular, pulmonary, gi and gu systems are negative, except as otherwise noted.    OBJECTIVE:     /83 (BP Location: Right arm, Patient Position: Sitting)  Pulse 78  Temp 97.9  F (36.6  C) (Tympanic)  Resp 14  Ht 5' 10\" (1.778 m)  Wt 179 lb (81.2 kg)  SpO2 98%  BMI 25.68 kg/m2  Body mass index is 25.68 kg/(m^2).  GENERAL: healthy, alert and no distress  NECK: no adenopathy, no asymmetry, masses, or scars and thyroid normal to palpation  RESP: lungs clear to auscultation - no rales, rhonchi or wheezes  CV: regular rate and rhythm, normal S1 S2, no S3 or S4, no murmur, click or rub, no peripheral edema and peripheral pulses strong  ABDOMEN: soft, nontender, no hepatosplenomegaly, no masses and bowel sounds normal  MS: no gross musculoskeletal defects noted, no edema  SKIN: pale pink maculopapular patches over trunk and bilateral arms and chest.        ASSESSMENT/PLAN:       ICD-10-CM "    1. Urticaria L50.9    Zyrtec during the day, benadryl at night.  Use Ice packs and OTC hydrocortisone if really itchy.  Avoid known allergens  Take zantac with the zyrtec  Unsure what you are reacting to.    F/u with allergist if this persists or worsens.      See Patient Instructions    CE Romo [facility]

## 2018-09-19 NOTE — PATIENT INSTRUCTIONS
Allergic reaction to SOMETHING...  Unsure what the reaction is to.     Take zyrtec during the day and Take benadryl at night.    If you take zantac with the zyrtec it will work better.

## 2018-09-19 NOTE — MR AVS SNAPSHOT
After Visit Summary   9/19/2018    Mynor Siddiqui    MRN: [id number]           Patient Information     Date Of Birth          1955        Visit Information        Provider Department      9/19/2018 12:40 PM Ni Youngblood APRN CNP [facility]        Care Instructions    Allergic reaction to SOMETHING...  Unsure what the reaction is to.     Take zyrtec during the day and Take benadryl at night.    If you take zantac with the zyrtec it will work better.                Follow-ups after your visit        Who to contact     If you have questions or need follow up information about today's clinic visit or your schedule please contact [facility] directly at [phone].  Normal or non-critical lab and imaging results will be communicated to you by [employer], letter or phone within 4 business days after the clinic has received the results. If you do not hear from us within 7 days, please contact the clinic through [employer] or phone. If you have a critical or abnormal lab result, we will notify you by phone as soon as possible.  Submit refill requests through [employer] or call your pharmacy and they will forward the refill request to us. Please allow 3 business days for your refill to be completed.          Additional Information About Your Visit        MyChart Information     [employer] gives you secure access to your electronic health record. If you see a primary care provider, you can also send messages to your care team and make appointments. If you have questions, please call your primary care clinic.  If you do not have a primary care provider, please call [phone] and they will assist you.        Care EveryWhere ID     This is your Care EveryWhere ID. This could be used by other organizations to access your [place] medical records  [id number]        Your Vitals Were     Pulse Temperature Respirations Height Pulse Oximetry BMI (Body Mass Index)    78 97.9  " F (36.6  C) (Tympanic) 14 5' 10\" (1.778 m) 98% 25.68 kg/m2       Blood Pressure from Last 3 Encounters:   09/19/18 125/83   05/23/18 114/70   09/26/17 118/69    Weight from Last 3 Encounters:   09/19/18 179 lb (81.2 kg)   05/23/18 194 lb 6.4 oz (88.2 kg)   09/26/17 208 lb 12.8 oz (94.7 kg)              Today, you had the following     No orders found for display       Primary Care Provider Office Phone # Fax #    Anthony Manzo [phone] [id number] [facility] [address]        Equal Access to Services     YOLANDE HOPPER : Shereen coolo Soelizabeth, waaxda luqadaha, qaybta kaalmada adeegyada, everardo moran . [facility] [phone].    ATENCIÓN: Si habla español, tiene a mayfield disposición servicios gratuitos de asistencia lingüística. [facility] [phone].    We comply with applicable federal civil rights laws and [place] laws. We do not discriminate on the basis of race, color, national origin, age, disability, sex, sexual orientation, or gender identity.            Thank you!     Thank you for choosing [facility]  for your care. Our goal is always to provide you with excellent care. Hearing back from our patients is one way we can continue to improve our services. Please take a few minutes to complete the written survey that you may receive in the mail after your visit with us. Thank you!             Your Updated Medication List - Protect others around you: Learn how to safely use, store and throw away your medicines at www.disposemymeds.org.          This list is accurate as of 9/19/18  1:06 PM.  Always use your most recent med list.                   Brand Name Dispense Instructions for use Diagnosis    buPROPion 150 MG 24 hr tablet    WELLBUTRIN XL    90 tablet    Take 1 tablet (150 mg) by mouth every morning    Adjustment disorder with mixed anxiety and depressed mood       diclofenac 50 MG EC tablet    VOLTAREN    60 tablet    TAKE 1 " TABLET BY MOUTH THREE TIMES DAILY AS NEEDED FOR MODERATE PAIN.    Acute knee pain, unspecified laterality       sildenafil 100 MG tablet    VIAGRA    12 tablet    Take 0.5-1 tablets ( mg) by mouth daily as needed for erectile dysfunction Take 30 min to 4 hours before intercourse.  Never use with nitroglycerin, terazosin or doxazosin.    Inability to maintain erection       sildenafil 20 MG tablet    REVATIO    90 tablet    3-5 tabs 1 hour before sex.    Combined arterial insufficiency and corporo-venous occlusive erectile dysfunction

## 2018-11-27 DIAGNOSIS — F43.23 ADJUSTMENT DISORDER WITH MIXED ANXIETY AND DEPRESSED MOOD: ICD-10-CM

## 2018-11-27 NOTE — TELEPHONE ENCOUNTER
**This refill requires provider completion and is not appropriate for RN review per RN refill guidelines.**  PH-Q9 needs to be less than 5 to approve medication on RN Refill protocol pt's score is 10.  Janna Keen RN

## 2018-11-27 NOTE — TELEPHONE ENCOUNTER
"BUPROPION XL 150MG TABLETS (24 H)        Last Written Prescription Date:  5/23/18  Last Fill Quantity: 90,   # refills: 1  Last Office Visit: 5/23/18  Future Office visit:       Requested Prescriptions   Pending Prescriptions Disp Refills     buPROPion (WELLBUTRIN XL) 150 MG 24 hr tablet [Pharmacy Med Name: BUPROPION XL 150MG TABLETS (24 H)] 90 tablet 0     Sig: TAKE 1 TABLET(150 MG) BY MOUTH EVERY MORNING    SSRIs Protocol Passed    11/27/2018 11:07 AM       Passed - Recent (12 mo) or future (30 days) visit within the authorizing provider's specialty    Patient had office visit in the last 12 months or has a visit in the next 30 days with authorizing provider or within the authorizing provider's specialty.  See \"Patient Info\" tab in inbasket, or \"Choose Columns\" in Meds & Orders section of the refill encounter.             Passed - Medication is Bupropion    If the medication is Bupropion (Wellbutrin), and the patient is taking for smoking cessation; OK to refill.         Passed - Patient is age 18 or older          "

## 2018-11-28 RX ORDER — BUPROPION HYDROCHLORIDE 150 MG/1
TABLET ORAL
Qty: 90 TABLET | Refills: 0 | Status: SHIPPED | OUTPATIENT
Start: 2018-11-28 | End: 2019-02-12

## 2018-12-04 ENCOUNTER — TELEPHONE (OUTPATIENT)
Dept: FAMILY MEDICINE | Facility: CLINIC | Age: 63
End: 2018-12-04

## 2018-12-04 ASSESSMENT — PATIENT HEALTH QUESTIONNAIRE - PHQ9: SUM OF ALL RESPONSES TO PHQ QUESTIONS 1-9: 9

## 2018-12-04 NOTE — TELEPHONE ENCOUNTER
Panel Management Review      Patient has the following on his problem list:     Depression / Dysthymia review    Measure:  Needs PHQ-9 score of 4 or less during index window.  Administer PHQ-9 and if score is 5 or more, send encounter to provider for next steps.    5 - 7 month window range: 11/23/2018    PHQ-9 SCORE 1/4/2017 9/26/2017 5/23/2018   PHQ-9 Total Score - - -   PHQ-9 Total Score 6 2 10       If PHQ-9 recheck is 5 or more, route to provider for next steps.    Patient is due for:  PHQ9      Composite cancer screening  Chart review shows that this patient is due/due soon for the following None  Summary:    Patient is due/failing the following:   PHQ9    Action needed:   Patient needs to do PHQ9.    Type of outreach:    Phone, spoke to patient.  patient has been feeling down, he will stop in if he needs to when he comes back from [place].     Questions for provider review:    None                                                                                                                                    Sherley Lutz [facility]     Chart routed to none.

## 2019-02-12 DIAGNOSIS — F43.23 ADJUSTMENT DISORDER WITH MIXED ANXIETY AND DEPRESSED MOOD: ICD-10-CM

## 2019-02-13 RX ORDER — BUPROPION HYDROCHLORIDE 150 MG/1
TABLET ORAL
Qty: 90 TABLET | Refills: 0 | Status: SHIPPED | OUTPATIENT
Start: 2019-02-13 | End: 2019-05-12

## 2019-02-13 NOTE — TELEPHONE ENCOUNTER
"BUPROPION XL 150MG TABLETS (24 H)      Last Written Prescription Date:  11/28/18  Last Fill Quantity: 90,   # refills: 0  Last Office Visit: 9/19/18  Future Office visit:       Requested Prescriptions   Pending Prescriptions Disp Refills     buPROPion (WELLBUTRIN XL) 150 MG 24 hr tablet [Pharmacy Med Name: BUPROPION XL 150MG TABLETS (24 H)] 90 tablet 0     Sig: TAKE 1 TABLET(150 MG) BY MOUTH EVERY MORNING    SSRIs Protocol Passed - 2/12/2019  4:56 PM       Passed - Recent (12 mo) or future (30 days) visit within the authorizing provider's specialty    Patient had office visit in the last 12 months or has a visit in the next 30 days with authorizing provider or within the authorizing provider's specialty.  See \"Patient Info\" tab in inbasket, or \"Choose Columns\" in Meds & Orders section of the refill encounter.             Passed - Medication is Bupropion    If the medication is Bupropion (Wellbutrin), and the patient is taking for smoking cessation; OK to refill.         Passed - Medication is active on med list       Passed - Patient is age 18 or older          "

## 2019-02-13 NOTE — TELEPHONE ENCOUNTER
**This refill requires provider completion and is not appropriate for RN review per RN refill guidelines.**  PH-Q9 needs to be less than 5 to approve medication on RN Refill protocol pt's score is 9.  Janna Keen RN

## 2019-05-12 DIAGNOSIS — F43.23 ADJUSTMENT DISORDER WITH MIXED ANXIETY AND DEPRESSED MOOD: ICD-10-CM

## 2019-05-13 RX ORDER — BUPROPION HYDROCHLORIDE 150 MG/1
TABLET ORAL
Qty: 90 TABLET | Refills: 0 | Status: SHIPPED | OUTPATIENT
Start: 2019-05-13 | End: 2019-08-17

## 2019-05-13 NOTE — TELEPHONE ENCOUNTER
Medication is being filled for 1 time refill only due to:  Patient needs to be seen because it will be one year and needs phq9.   Cholo Willis RN

## 2019-05-13 NOTE — TELEPHONE ENCOUNTER
"BUPROPION XL 150MG TABLETS (24 H)      Last Written Prescription Date:  2/13/19  Last Fill Quantity: 90,   # refills: 0  Last Office Visit: 9/19/18  Future Office visit:       Requested Prescriptions   Pending Prescriptions Disp Refills     buPROPion (WELLBUTRIN XL) 150 MG 24 hr tablet [Pharmacy Med Name: BUPROPION XL 150MG TABLETS (24 H)] 90 tablet 0     Sig: TAKE 1 TABLET(150 MG) BY MOUTH EVERY MORNING       SSRIs Protocol Passed - 5/12/2019  7:51 PM        Passed - Recent (12 mo) or future (30 days) visit within the authorizing provider's specialty     Patient had office visit in the last 12 months or has a visit in the next 30 days with authorizing provider or within the authorizing provider's specialty.  See \"Patient Info\" tab in inbasket, or \"Choose Columns\" in Meds & Orders section of the refill encounter.              Passed - Medication is Bupropion     If the medication is Bupropion (Wellbutrin), and the patient is taking for smoking cessation; OK to refill.          Passed - Medication is active on med list        Passed - Patient is age 18 or older          "

## 2019-06-17 DIAGNOSIS — N52.03 COMBINED ARTERIAL INSUFFICIENCY AND CORPORO-VENOUS OCCLUSIVE ERECTILE DYSFUNCTION: ICD-10-CM

## 2019-06-17 NOTE — TELEPHONE ENCOUNTER
"SILDENAFIL 20MG TABLETS      Last Written Prescription Date:  5/23/18  Last Fill Quantity: 90,   # refills: 11  Last Office Visit: 9/19/18  Future Office visit:       Requested Prescriptions   Pending Prescriptions Disp Refills     sildenafil (REVATIO) 20 MG tablet [Pharmacy Med Name: SILDENAFIL 20MG TABLETS] 90 tablet 0     Sig: TAKE 3-5 TABLETS BY MOUTH 1 HOUR PRIOR TO SEX.       Erectile Dysfuction Protocol Failed - 6/17/2019 11:51 AM        Failed - Recent (12 mo) or future (30 days) visit within the authorizing provider's specialty     Patient had office visit in the last 12 months or has a visit in the next 30 days with authorizing provider or within the authorizing provider's specialty.  See \"Patient Info\" tab in inbasket, or \"Choose Columns\" in Meds & Orders section of the refill encounter.              Passed - Absence of nitrates on medication list        Passed - Absence of Alpha Blockers on Med list        Passed - Medication is active on med list        Passed - Patient is age 18 or older          "

## 2019-06-18 NOTE — TELEPHONE ENCOUNTER
Routing refill request to provider for review/approval because:  Patient needs to be seen because it has been more than 1 year since last office visit. 5/23/18.  Cholo Willis RN

## 2019-06-19 RX ORDER — SILDENAFIL CITRATE 20 MG/1
TABLET ORAL
Qty: 90 TABLET | Refills: 0 | Status: SHIPPED | OUTPATIENT
Start: 2019-06-19

## 2019-08-17 DIAGNOSIS — F43.23 ADJUSTMENT DISORDER WITH MIXED ANXIETY AND DEPRESSED MOOD: ICD-10-CM

## 2019-08-19 DIAGNOSIS — F43.23 ADJUSTMENT DISORDER WITH MIXED ANXIETY AND DEPRESSED MOOD: ICD-10-CM

## 2019-08-19 RX ORDER — BUPROPION HYDROCHLORIDE 150 MG/1
TABLET ORAL
Qty: 30 TABLET | Refills: 0 | Status: SHIPPED | OUTPATIENT
Start: 2019-08-19

## 2019-08-19 RX ORDER — BUPROPION HYDROCHLORIDE 150 MG/1
TABLET ORAL
Qty: 30 TABLET | Refills: 0 | Status: SHIPPED | OUTPATIENT
Start: 2019-08-19 | End: 2019-08-19

## 2019-08-19 NOTE — TELEPHONE ENCOUNTER
Routing refill request to provider for review/approval because:  Patient needs to be seen because it has been more than 1 year since last office visit. 5/23/18  PHQ-9 SCORE 9/26/2017 5/23/2018 12/4/2018   PHQ-9 Total Score - - -   PHQ-9 Total Score 2 10 9     Cholo Willis RN

## 2019-08-19 NOTE — TELEPHONE ENCOUNTER
"BUPROPION XL 150MG TABLETS (24 H)      Last Written Prescription Date:  5/13/19  Last Fill Quantity: 90,   # refills: 0  Last Office Visit: 9/19/18  Future Office visit:       Requested Prescriptions   Pending Prescriptions Disp Refills     buPROPion (WELLBUTRIN XL) 150 MG 24 hr tablet [Pharmacy Med Name: BUPROPION XL 150MG TABLETS (24 H)] 90 tablet 0     Sig: TAKE 1 TABLET BY MOUTH EVERY MORNING       SSRIs Protocol Failed - 8/17/2019 10:39 AM        Failed - PHQ-9 score less than 5 in past 6 months     Please review last PHQ-9 score.           Failed - Recent (6 mo) or future (30 days) visit within the authorizing provider's specialty     Patient had office visit in the last 6 months or has a visit in the next 30 days with authorizing provider or within the authorizing provider's specialty.  See \"Patient Info\" tab in inbasket, or \"Choose Columns\" in Meds & Orders section of the refill encounter.            Passed - Medication is Bupropion     If the medication is Bupropion (Wellbutrin), and the patient is taking for smoking cessation; OK to refill.          Passed - Medication is active on med list        Passed - Patient is age 18 or older          "

## 2019-08-31 ENCOUNTER — MYC MEDICAL ADVICE (OUTPATIENT)
Dept: FAMILY MEDICINE | Facility: CLINIC | Age: 64
End: 2019-08-31

## 2019-09-28 ENCOUNTER — HEALTH MAINTENANCE LETTER (OUTPATIENT)
Age: 64
End: 2019-09-28

## 2020-03-15 ENCOUNTER — HEALTH MAINTENANCE LETTER (OUTPATIENT)
Age: 65
End: 2020-03-15

## 2021-01-10 ENCOUNTER — HEALTH MAINTENANCE LETTER (OUTPATIENT)
Age: 66
End: 2021-01-10

## 2021-05-08 ENCOUNTER — HEALTH MAINTENANCE LETTER (OUTPATIENT)
Age: 66
End: 2021-05-08

## 2021-10-23 ENCOUNTER — HEALTH MAINTENANCE LETTER (OUTPATIENT)
Age: 66
End: 2021-10-23

## 2022-05-31 ENCOUNTER — APPOINTMENT (RX ONLY)
Dept: URBAN - METROPOLITAN AREA CLINIC 75 | Facility: CLINIC | Age: 67
Setting detail: DERMATOLOGY
End: 2022-05-31

## 2022-05-31 DIAGNOSIS — D485 NEOPLASM OF UNCERTAIN BEHAVIOR OF SKIN: ICD-10-CM

## 2022-05-31 PROBLEM — D48.5 NEOPLASM OF UNCERTAIN BEHAVIOR OF SKIN: Status: ACTIVE | Noted: 2022-05-31

## 2022-05-31 PROCEDURE — 11102 TANGNTL BX SKIN SINGLE LES: CPT

## 2022-05-31 PROCEDURE — ? BIOPSY BY SHAVE METHOD

## 2022-05-31 PROCEDURE — ? ADDITIONAL NOTES

## 2022-05-31 ASSESSMENT — LOCATION SIMPLE DESCRIPTION DERM: LOCATION SIMPLE: LEFT EYEBROW

## 2022-05-31 ASSESSMENT — LOCATION DETAILED DESCRIPTION DERM: LOCATION DETAILED: LEFT LATERAL EYEBROW

## 2022-05-31 ASSESSMENT — LOCATION ZONE DERM: LOCATION ZONE: FACE

## 2022-06-04 ENCOUNTER — HEALTH MAINTENANCE LETTER (OUTPATIENT)
Age: 67
End: 2022-06-04

## 2022-10-10 ENCOUNTER — HEALTH MAINTENANCE LETTER (OUTPATIENT)
Age: 67
End: 2022-10-10

## 2023-06-10 ENCOUNTER — HEALTH MAINTENANCE LETTER (OUTPATIENT)
Age: 68
End: 2023-06-10